# Patient Record
Sex: FEMALE | Race: WHITE | NOT HISPANIC OR LATINO | Employment: UNEMPLOYED | URBAN - NONMETROPOLITAN AREA
[De-identification: names, ages, dates, MRNs, and addresses within clinical notes are randomized per-mention and may not be internally consistent; named-entity substitution may affect disease eponyms.]

---
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
## 2020-07-24 ENCOUNTER — TELEPHONE (OUTPATIENT)
Dept: URGENT CARE | Facility: CLINIC | Age: 20
End: 2020-07-24

## 2020-07-24 DIAGNOSIS — N39.0 ACUTE UTI (URINARY TRACT INFECTION): Primary | ICD-10-CM

## 2020-07-24 RX ORDER — NITROFURANTOIN 25; 75 MG/1; MG/1
100 CAPSULE ORAL 2 TIMES DAILY
Qty: 20 CAPSULE | Refills: 0 | Status: SHIPPED | OUTPATIENT
Start: 2020-07-24 | End: 2021-05-13

## 2021-05-13 ENCOUNTER — OFFICE VISIT (OUTPATIENT)
Dept: PSYCHIATRY | Facility: CLINIC | Age: 21
End: 2021-05-13

## 2021-05-13 VITALS
TEMPERATURE: 97.7 F | SYSTOLIC BLOOD PRESSURE: 116 MMHG | BODY MASS INDEX: 19.54 KG/M2 | DIASTOLIC BLOOD PRESSURE: 70 MMHG | HEART RATE: 90 BPM | HEIGHT: 66 IN | WEIGHT: 121.6 LBS | RESPIRATION RATE: 18 BRPM

## 2021-05-13 DIAGNOSIS — F41.1 GAD (GENERALIZED ANXIETY DISORDER): Primary | ICD-10-CM

## 2021-05-13 PROCEDURE — 90792 PSYCH DIAG EVAL W/MED SRVCS: CPT | Performed by: NURSE PRACTITIONER

## 2021-05-13 RX ORDER — BUSPIRONE HYDROCHLORIDE 10 MG/1
10 TABLET ORAL 2 TIMES DAILY
COMMUNITY
Start: 2021-04-12

## 2021-05-13 RX ORDER — LAMOTRIGINE 25 MG/1
TABLET ORAL
Qty: 45 TABLET | Refills: 0 | Status: SHIPPED | OUTPATIENT
Start: 2021-05-13 | End: 2021-06-11

## 2021-05-13 RX ORDER — NORGESTIMATE AND ETHINYL ESTRADIOL 7DAYSX3 LO
KIT ORAL
COMMUNITY
Start: 2021-04-12

## 2021-05-13 RX ORDER — HEPATITIS B VACCINE (RECOMBINANT) 20 UG/ML
INJECTION, SUSPENSION INTRAMUSCULAR
COMMUNITY
End: 2021-06-07

## 2021-05-13 RX ORDER — TETANUS TOXOID, REDUCED DIPHTHERIA TOXOID AND ACELLULAR PERTUSSIS VACCINE, ADSORBED 5; 2.5; 8; 8; 2.5 [IU]/.5ML; [IU]/.5ML; UG/.5ML; UG/.5ML; UG/.5ML
SUSPENSION INTRAMUSCULAR
COMMUNITY
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
## 2021-06-07 ENCOUNTER — HOSPITAL ENCOUNTER (EMERGENCY)
Facility: HOSPITAL | Age: 21
Discharge: HOME | End: 2021-06-07
Attending: EMERGENCY MEDICINE | Admitting: EMERGENCY MEDICINE

## 2021-06-07 ENCOUNTER — TELEPHONE (OUTPATIENT)
Dept: PSYCHIATRY | Facility: CLINIC | Age: 21
End: 2021-06-07

## 2021-06-07 VITALS
HEART RATE: 122 BPM | RESPIRATION RATE: 16 BRPM | HEIGHT: 66 IN | OXYGEN SATURATION: 100 % | DIASTOLIC BLOOD PRESSURE: 83 MMHG | TEMPERATURE: 98.9 F | SYSTOLIC BLOOD PRESSURE: 128 MMHG | BODY MASS INDEX: 18.96 KG/M2 | WEIGHT: 118 LBS

## 2021-06-07 DIAGNOSIS — B01.9 VARICELLA WITHOUT COMPLICATION: Primary | ICD-10-CM

## 2021-06-07 PROCEDURE — 99282 EMERGENCY DEPT VISIT SF MDM: CPT

## 2021-06-07 RX ORDER — ACETAMINOPHEN 500 MG
500 TABLET ORAL EVERY 6 HOURS PRN
Qty: 60 TABLET | Refills: 0 | Status: SHIPPED | OUTPATIENT
Start: 2021-06-07

## 2021-06-07 RX ORDER — DIPHENHYDRAMINE HCL 50 MG
50 CAPSULE ORAL EVERY 6 HOURS PRN
Qty: 60 CAPSULE | Refills: 0 | Status: SHIPPED | OUTPATIENT
Start: 2021-06-07 | End: 2021-06-11

## 2021-06-07 RX ORDER — IBUPROFEN 800 MG/1
800 TABLET ORAL EVERY 6 HOURS PRN
Qty: 90 TABLET | Refills: 0 | Status: SHIPPED | OUTPATIENT
Start: 2021-06-07

## 2021-06-07 NOTE — ED PROVIDER NOTES
Subjective   20-year-old female who presents to the emergency department chief complaint skin rash for the past 2 days.  Patient states she has had worsening vesicular rash that is now covering her entire body.  Patient does complain of low-grade fever.  Patient does state has been itching.  Denies any other associated complaints.      History provided by:  Patient   used: No    Rash  Location:  Full body  Quality: itchiness and redness    Severity:  Moderate  Onset quality:  Gradual  Duration:  1 day  Timing:  Intermittent  Progression:  Worsening  Chronicity:  New  Context: not animal contact, not chemical exposure, not diapers, not eggs, not insect bite/sting, not medications, not new detergent/soap, not nuts, not plant contact, not pregnancy and not sick contacts    Relieved by:  Nothing  Worsened by:  Nothing  Ineffective treatments:  None tried  Associated symptoms: myalgias    Associated symptoms: no abdominal pain, no diarrhea, no fatigue, no fever, no headaches, no induration, no joint pain, no periorbital edema, no shortness of breath, no sore throat, no throat swelling, no tongue swelling, no URI and not vomiting        Review of Systems   Constitutional: Negative.  Negative for activity change, appetite change, chills, diaphoresis, fatigue and fever.   HENT: Negative for sore throat.    Eyes: Negative.  Negative for pain, discharge and itching.   Respiratory: Negative.  Negative for apnea, choking, chest tightness and shortness of breath.    Cardiovascular: Negative.  Negative for chest pain and leg swelling.   Gastrointestinal: Negative.  Negative for abdominal pain, diarrhea and vomiting.   Endocrine: Negative.  Negative for cold intolerance, heat intolerance and polydipsia.   Genitourinary: Negative.  Negative for difficulty urinating, dyspareunia, dysuria, enuresis, flank pain, frequency and hematuria.   Musculoskeletal: Positive for myalgias. Negative for arthralgias, back pain  and gait problem.   Skin: Positive for rash.   Neurological: Negative.  Negative for dizziness, seizures, facial asymmetry, speech difficulty, numbness and headaches.   Hematological: Negative.  Negative for adenopathy. Does not bruise/bleed easily.   All other systems reviewed and are negative.      Past Medical History:   Diagnosis Date   • Anxiety        Allergies   Allergen Reactions   • Cephalexin Hives       History reviewed. No pertinent surgical history.    Family History   Problem Relation Age of Onset   • Anxiety disorder Mother    • Depression Mother    • Anxiety disorder Sister    • Depression Sister    • Anxiety disorder Maternal Aunt    • Bipolar disorder Maternal Aunt    • Bipolar disorder Maternal Uncle    • Anxiety disorder Maternal Grandmother    • Bipolar disorder Paternal Grandmother    • ADD / ADHD Neg Hx    • Alcohol abuse Neg Hx    • Dementia Neg Hx    • Drug abuse Neg Hx    • OCD Neg Hx    • Paranoid behavior Neg Hx    • Schizophrenia Neg Hx    • Seizures Neg Hx    • Self-Injurious Behavior  Neg Hx    • Suicide Attempts Neg Hx        Social History     Socioeconomic History   • Marital status: Single     Spouse name: Not on file   • Number of children: Not on file   • Years of education: Not on file   • Highest education level: Not on file   Tobacco Use   • Smoking status: Never Smoker   • Smokeless tobacco: Never Used   Vaping Use   • Vaping Use: Never used   Substance and Sexual Activity   • Alcohol use: Not Currently   • Drug use: Never           Objective   Physical Exam  Vitals and nursing note reviewed.   Constitutional:       General: She is not in acute distress.     Appearance: Normal appearance. She is normal weight. She is not ill-appearing, toxic-appearing or diaphoretic.   Cardiovascular:      Rate and Rhythm: Normal rate and regular rhythm.      Pulses: Normal pulses.      Heart sounds: Normal heart sounds. No murmur heard.   No friction rub. No gallop.    Pulmonary:       Effort: Pulmonary effort is normal. No respiratory distress.      Breath sounds: Normal breath sounds. No stridor. No wheezing, rhonchi or rales.   Chest:      Chest wall: No tenderness.   Musculoskeletal:      Cervical back: Normal range of motion and neck supple. No rigidity or tenderness.   Lymphadenopathy:      Cervical: No cervical adenopathy.   Skin:     Capillary Refill: Capillary refill takes less than 2 seconds.      Findings: Erythema and rash present.          Neurological:      General: No focal deficit present.      Mental Status: She is alert and oriented to person, place, and time.      Cranial Nerves: No cranial nerve deficit.      Sensory: No sensory deficit.      Motor: No weakness.      Coordination: Coordination normal.      Gait: Gait normal.   Psychiatric:         Mood and Affect: Mood normal.         Behavior: Behavior normal.         Thought Content: Thought content normal.         Judgment: Judgment normal.         Procedures           ED Course  ED Course as of Jun 07 1650 Mon Jun 07, 2021 1647 Dr. Harrell as well with myself is evaluated patient.    [BH]      ED Course User Index  [] Steffen Fuchs PA-C MDM    Final diagnoses:   Varicella without complication       ED Disposition  ED Disposition     ED Disposition Condition Comment    Discharge Stable           Daysi Worrell, DO  [address] [facility] [address] [phone]    Call in 1 day           Medication List      New Prescriptions    acetaminophen 500 MG tablet  Commonly known as: TYLENOL  Take 1 tablet by mouth Every 6 (Six) Hours As Needed for Mild Pain .     diphenhydrAMINE 50 MG capsule  Commonly known as: BENADRYL  Take 1 capsule by mouth Every 6 (Six) Hours As Needed for Itching.     ibuprofen 800 MG tablet  Commonly known as: ADVIL,MOTRIN  Take 1 tablet by mouth Every 6 (Six) Hours As Needed for Mild Pain .           Where to Get Your Medications       These medications were sent to [id number] - [place], [place] - [address] - [phone]  - [phone] FX  [address] [place] [address] [place] [address]    Phone: [phone]   · acetaminophen 500 MG tablet  · diphenhydrAMINE 50 MG capsule  · ibuprofen 800 MG tablet          Steffen Fuchs PA-C  06/07/21 3511

## 2021-06-07 NOTE — TELEPHONE ENCOUNTER
ADVISED PT, SHE VOICED HER UNDERSTANDING. ADVISED HER IF SYMPTOMS WORSEN GO TO THE ED OR BACK TO [facility].
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
## 2021-06-07 NOTE — TELEPHONE ENCOUNTER
PT CALLED STATING THAT SHE HAS BEEN ON LAMOTRIGINE FOR ABOUT A MONTH NOW AND HAS NOTICED SINCE Saturday SHE HAS HAD A RASH STARTED ON HER SHOULDERS AND NOW IS ALL OVER HER ENTIRE BODY. NOT SURE IF ITS MEDICATION RELATED OR A ALLERGIC REACTION TO SOMETHING. PLEASE ADVISE

## 2021-06-11 ENCOUNTER — TELEMEDICINE (OUTPATIENT)
Dept: PSYCHIATRY | Facility: CLINIC | Age: 21
End: 2021-06-11

## 2021-06-11 DIAGNOSIS — F41.1 GAD (GENERALIZED ANXIETY DISORDER): Primary | ICD-10-CM

## 2021-06-11 PROCEDURE — 99214 OFFICE O/P EST MOD 30 MIN: CPT | Performed by: NURSE PRACTITIONER

## 2021-06-11 RX ORDER — LAMOTRIGINE 100 MG/1
50 TABLET ORAL DAILY
Qty: 30 TABLET | Refills: 1 | Status: SHIPPED | OUTPATIENT
Start: 2021-06-11 | End: 2021-07-19 | Stop reason: ALTCHOICE

## 2021-06-11 RX ORDER — VALACYCLOVIR HYDROCHLORIDE 1 G/1
1000 TABLET, FILM COATED ORAL 3 TIMES DAILY
COMMUNITY

## 2021-06-11 NOTE — PROGRESS NOTES
"This provider is located at [facility] ,[address], [address] [facility] in [place], [place],using a secure MyChart Video Visit through [employer]. Patient is being seen remotely via telehealth at their home address in [place], and stated they are in a secure environment for this session. The patient's condition being diagnosed/treated is appropriate for telemedicine. The provider identified herself as well as her credentials.   The patient, and/or patients guardian, consent to be seen remotely, and when consent is given they understand that the consent allows for patient identifiable information to be sent to a third party as needed.   They may refuse to be seen remotely at any time. The electronic data is encrypted and password protected, and the patient and/or guardian has been advised of the potential risks to privacy not withstanding such measures.    Chief Complaint  Anxiety and panic    Subjective          Tonie Chao presents today via MyChart Video through [employer] by herself for a follow up and medication check.    History of Present Illness: Tonie states, \"I have the varicella virus.\" Tonie presents to the video with a red, rash on her bilateral cheeks and notably swollen lips. She called the clinic on 6/7/21 to report symptoms of a rash \"starting on shoulders and moved to entire body\" and was encouraged to stop Lamictal after being treated at urgent care. Tonie tells me that she presented to the ED that same day and was diagnosed with chicken pox. She states, \"they were really sure it was chicken pox. They said dew drops on breanna petals.\" Tonie tells me that had both of her chicken pox vaccines. Tonie inquires if she should resume her Lamictal. She states, \"I was feeling pretty good.\" She tells me that she noticed she was not so \"obsessive\" after taking this medication for a week or so. She also tells me that her boyfriend and best friend noticed \"I seemed to be a lot " "happier.\" Tonie reports stopping all of her psychiatric medications for the last three days as she was experiencing difficulty swallowing. She is only taking Valcyclovir 1,000 mg twice daily and ibuprofen. She denies any problems with sleep but does appear to be thinner as she has had difficulty swallowing. She denies any SI/HI/AVH.    Current Medications:   Current Outpatient Medications   Medication Sig Dispense Refill   • acetaminophen (TYLENOL) 500 MG tablet Take 1 tablet by mouth Every 6 (Six) Hours As Needed for Mild Pain . 60 tablet 0   • busPIRone (BUSPAR) 10 MG tablet Take 10 mg by mouth 2 (Two) Times a Day.     • ibuprofen (ADVIL,MOTRIN) 800 MG tablet Take 1 tablet by mouth Every 6 (Six) Hours As Needed for Mild Pain . 90 tablet 0   • norgestimate-ethinyl estradiol (Tri-Lo-Lani) 0.18/0.215/0.25 MG-25 MCG per tablet TAKE 1 TABLET BY MOUTH ONE TIME A DAY     • valACYclovir (VALTREX) 1000 MG tablet Take 1,000 mg by mouth 3 (Three) Times a Day.     • lamoTRIgine (LaMICtal) 100 MG tablet Take 0.5 tablets by mouth Daily. 30 tablet 1   • Tdap (Boostrix) 5-2.5-18.5 LF-MCG/0.5 injection Boostrix Tdap 2.5 Lf unit-8 mcg-5 Lf/0.5 mL intramuscular syringe   ADMINISTER VACCINE PER PHYSICIAN PROTOCOL       No current facility-administered medications for this visit.       Objective   Vital Signs:   There were no vitals taken for this visit.    Physical Exam  Nursing note reviewed. Vitals reviewed: Vitals not obtained due to nature of telehealth visit.   Constitutional:       Appearance: She is ill-appearing.   Neurological:      General: No focal deficit present.      Mental Status: She is alert and oriented to person, place, and time.   Psychiatric:         Attention and Perception: Attention normal.         Mood and Affect: Mood is anxious.         Speech: Speech normal.         Behavior: Behavior normal. Behavior is cooperative.         Thought Content: Thought content normal.         Cognition and Memory: Cognition " normal.         Judgment: Judgment normal.        Result Review :            Assessment and Plan    Problem List Items Addressed This Visit     None      Visit Diagnoses     TRICIA (generalized anxiety disorder)    -  Primary    Relevant Medications    lamoTRIgine (LaMICtal) 100 MG tablet          Mental Status Exam:   Hygiene:   good  Cooperation:  Cooperative  Eye Contact:  Good  Psychomotor Behavior:  Appropriate  Affect:  Appropriate  Mood: anxious  Speech:  Normal  Thought Process:  Goal directed and Linear  Thought Content:  Normal  Suicidal: None  Homicidal:  None  Hallucinations:  None  Delusion:  None  Memory:  Intact  Orientation:  Person, Place, Time and Situation  Reliability:  good  Insight:  Good  Judgement:  Good  Impulse Control:  Good  Physical/Medical Issues:  Yes Varicella virus     PHQ-9 Score:   PHQ-9 Total Score: 0    Impression/Plan:  -This is a follow up and medication check. Tonie has been diagnosed with the varicella virus. She has a vesicular rash located on her shoulders, back, cheeks, and in her mouth/throat. She has been experiencing difficulty swallowing and has not taken her psychiatric medications or oral birth control for three days. She is able to tolerate swallowing pills and would like to resume her medications. She reports improved anxiety with Lamictal. We discussed using an additional form of protection as she has not been able to take her birth control for three says. She verbalizes understanding.   -Resume Lamictal 50 mg daily for anxiety. Patient will take 1/2 tablet of 100 mg.  -Continue Buspar 10 mg twice daily.  -I personally reviewed Tonie's ED note from 6/7/21 confirming her varicella diagnosis.      MEDS ORDERED DURING VISIT:  New Medications Ordered This Visit   Medications   • lamoTRIgine (LaMICtal) 100 MG tablet     Sig: Take 0.5 tablets by mouth Daily.     Dispense:  30 tablet     Refill:  1       I spent 32 minutes caring for Tonie on this date of service. This time  includes time spent by me in the following activities:preparing for the visit, reviewing tests, obtaining and/or reviewing a separately obtained history, performing a medically appropriate examination and/or evaluation , counseling and educating the patient/family/caregiver, ordering medications, tests, or procedures, documenting information in the medical record and care coordination  Follow Up   Return in about 2 months (around 8/11/2021) for Medication Check.  Patient was given instructions and counseling regarding her condition or for health maintenance advice. Please see specific information pulled into the AVS if appropriate.       TREATMENT PLAN/GOALS: Continue supportive psychotherapy efforts and medications as indicated. Treatment and medication options discussed during today's visit. Patient acknowledged and verbally consented to continue with current treatment plan and was educated on the importance of compliance with treatment and follow-up appointments.    MEDICATION ISSUES:  Discussed medication options and treatment plan of prescribed medication as well as the risks, benefits, and side effects including potential falls, possible impaired driving and metabolic adversities among others. Patient is agreeable to call the office with any worsening of symptoms or onset of side effects. Patient is agreeable to call 911 or go to the nearest ER should he/she begin having SI/HI.      This document has been electronically signed by NEERU Carey, PMHNP-BC  June 11, 2021 09:43 EDT    Part of this note may be an electronic transcription/translation of spoken language to printed text using the Dragon Dictation System.

## 2021-07-19 ENCOUNTER — TELEPHONE (OUTPATIENT)
Dept: PSYCHIATRY | Facility: CLINIC | Age: 21
End: 2021-07-19

## 2021-07-19 DIAGNOSIS — F41.1 GAD (GENERALIZED ANXIETY DISORDER): Primary | ICD-10-CM

## 2021-07-19 RX ORDER — SERTRALINE HYDROCHLORIDE 25 MG/1
TABLET, FILM COATED ORAL
Qty: 42 TABLET | Refills: 0 | Status: SHIPPED | OUTPATIENT
Start: 2021-07-19 | End: 2021-08-16

## 2021-07-19 NOTE — TELEPHONE ENCOUNTER
She has tried Prozac and Lexapro can also have a potential for [place] rash. She can try this medication and contact me if she has any problems with side effects.

## 2021-07-19 NOTE — TELEPHONE ENCOUNTER
ADVISED PT. SHE HAS CONCERNS WITH ZOLOFT DUE TO BAD SIDE EFFECTS IN THE PAST WITH SSRI'S BUT SHE WILL TRY IT. PLEASE SEND TO [facility] [place].

## 2021-07-19 NOTE — TELEPHONE ENCOUNTER
We can try Zoloft at night for anxiety if she'd like. I can send in 25 mg to take for two weeks then increase to 50 mg. We follow up in August.

## 2021-07-19 NOTE — TELEPHONE ENCOUNTER
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 She stopped Lamictal. Please advise